# Patient Record
Sex: FEMALE | Race: WHITE | NOT HISPANIC OR LATINO | URBAN - METROPOLITAN AREA
[De-identification: names, ages, dates, MRNs, and addresses within clinical notes are randomized per-mention and may not be internally consistent; named-entity substitution may affect disease eponyms.]

---

## 2020-07-25 ENCOUNTER — TELEPHONE ENCOUNTER (OUTPATIENT)
Dept: URBAN - METROPOLITAN AREA CLINIC 13 | Facility: CLINIC | Age: 65
End: 2020-07-25

## 2020-07-25 RX ORDER — ESTRADIOL 0.75 MG/1.25G
USE AS DIRECTED GEL, METERED TOPICAL
Refills: 0 | OUTPATIENT
Start: 2011-08-19 | End: 2011-09-08

## 2020-07-25 RX ORDER — ESTRADIOL 0.75 MG/1.25G
APPLY DAILY AS DIRECTED GEL, METERED TOPICAL
Refills: 0 | OUTPATIENT
Start: 2011-09-08 | End: 2018-12-12

## 2020-07-25 RX ORDER — POLYETHYLENE GLYCOL 3350 AND ELECTROLYTES WITH LEMON FLAVOR 236; 22.74; 6.74; 5.86; 2.97 G/4L; G/4L; G/4L; G/4L; G/4L
DRINK 8 OUNCES EVERY 20 MINUTES UNTIL HALF OF THE SOLUTION IS CONSUMED. DRINK THE LAST HALF OF THE SOLUTION 6 HOURS PRIOR TO PROCEDURE TIME POWDER, FOR SOLUTION ORAL
Qty: 1 | Refills: 0 | OUTPATIENT
Start: 2013-10-25 | End: 2013-10-29

## 2020-07-26 ENCOUNTER — TELEPHONE ENCOUNTER (OUTPATIENT)
Dept: URBAN - METROPOLITAN AREA CLINIC 13 | Facility: CLINIC | Age: 65
End: 2020-07-26

## 2020-07-26 RX ORDER — TOPIRAMATE 100 MG/1
TAKE 1 CAPSULE BY MOUTH EVERY DAY CAPSULE, EXTENDED RELEASE ORAL
Qty: 30 | Refills: 0 | Status: ACTIVE | COMMUNITY
Start: 2018-11-07

## 2020-07-26 RX ORDER — MELOXICAM 15 MG/1
TAKE 1 TABLET BY MOUTH EVERY DAYY TABLET ORAL
Qty: 90 | Refills: 0 | Status: ACTIVE | COMMUNITY
Start: 2019-02-25

## 2020-07-26 RX ORDER — MELOXICAM 15 MG/1
TABLET ORAL
Qty: 30 | Refills: 0 | Status: ACTIVE | COMMUNITY
Start: 2013-07-09

## 2020-07-26 RX ORDER — HYDROCODONE BITARTRATE AND ACETAMINOPHEN 10; 325 MG/1; MG/1
TABLET ORAL
Qty: 60 | Refills: 0 | Status: ACTIVE | COMMUNITY
Start: 2013-04-29

## 2020-07-26 RX ORDER — CARISOPRODOL 350 MG/1
TABLET ORAL
Qty: 50 | Refills: 0 | Status: ACTIVE | COMMUNITY
Start: 2013-04-09

## 2020-07-26 RX ORDER — HYDROCODONE BITARTRATE AND ACETAMINOPHEN 5; 325 MG/1; MG/1
TABLET ORAL
Qty: 50 | Refills: 0 | Status: ACTIVE | COMMUNITY
Start: 2013-04-09

## 2020-07-26 RX ORDER — IBANDRONATE SODIUM 150 MG
TAKE 1 TABLET ONCE MONTHLY TABLET ORAL
Refills: 0 | Status: ACTIVE | COMMUNITY

## 2020-07-26 RX ORDER — ALENDRONATE SODIUM 70 MG/1
TABLET ORAL
Qty: 4 | Refills: 0 | Status: ACTIVE | COMMUNITY
Start: 2013-06-11

## 2020-07-26 RX ORDER — POLYETHYLENE GLYCOL 3350, SODIUM CHLORIDE, SODIUM BICARBONATE AND POTASSIUM CHLORIDE WITH LEMON FLAVOR 420; 11.2; 5.72; 1.48 G/4L; G/4L; G/4L; G/4L
TAKE 1/2 GALLON AT 5:00 PM DAY BEFORE PROCEDURE, TAKE SECOND 1/2 OF GALLON 6 HRS PRIOR TO PROCEDURE POWDER, FOR SOLUTION ORAL
Qty: 1 | Refills: 0 | Status: ACTIVE | COMMUNITY
Start: 2018-12-12

## 2020-07-26 RX ORDER — PHENTERMINE HYDROCHLORIDE 15 MG/1
TAKE ONE CAPSULE BY MOUTH EVERY MORNING CAPSULE ORAL
Qty: 30 | Refills: 0 | Status: ACTIVE | COMMUNITY
Start: 2018-12-05

## 2020-07-26 RX ORDER — POLYETHYLENE GLYCOL 3350, SODIUM CHLORIDE, SODIUM BICARBONATE AND POTASSIUM CHLORIDE WITH LEMON FLAVOR 420; 11.2; 5.72; 1.48 G/4L; G/4L; G/4L; G/4L
POWDER, FOR SOLUTION ORAL
Qty: 255 | Refills: 0 | Status: ACTIVE | COMMUNITY
Start: 2013-10-26

## 2020-07-26 RX ORDER — AZITHROMYCIN DIHYDRATE 250 MG/1
TABLET, FILM COATED ORAL
Qty: 6 | Refills: 0 | Status: ACTIVE | COMMUNITY
Start: 2013-10-09

## 2020-07-26 RX ORDER — TOPIRAMATE 50 MG/1
TAKE ONE CAPSULE BY MOUTH EVERY DAY CAPSULE, EXTENDED RELEASE ORAL
Qty: 30 | Refills: 0 | Status: ACTIVE | COMMUNITY
Start: 2018-07-13

## 2020-07-26 RX ORDER — PHENTERMINE HYDROCHLORIDE 30 MG/1
TAKE ONE CAPSULE BY MOUTH EVERY MORNING CAPSULE ORAL
Qty: 30 | Refills: 0 | Status: ACTIVE | COMMUNITY
Start: 2019-01-11

## 2022-10-21 ENCOUNTER — OFFICE VISIT (OUTPATIENT)
Dept: URBAN - METROPOLITAN AREA SURGERY CENTER 25 | Facility: SURGERY CENTER | Age: 67
End: 2022-10-21

## 2022-12-01 ENCOUNTER — OFFICE VISIT (OUTPATIENT)
Dept: URBAN - METROPOLITAN AREA SURGERY CENTER 25 | Facility: SURGERY CENTER | Age: 67
End: 2022-12-01

## 2023-01-25 ENCOUNTER — TELEPHONE ENCOUNTER (OUTPATIENT)
Dept: URBAN - METROPOLITAN AREA CLINIC 113 | Facility: CLINIC | Age: 68
End: 2023-01-25

## 2023-01-25 RX ORDER — HYDROCODONE BITARTRATE AND ACETAMINOPHEN 5; 325 MG/1; MG/1
TABLET ORAL
Qty: 50 | Refills: 0 | Status: ACTIVE | COMMUNITY
Start: 2013-04-09

## 2023-01-25 RX ORDER — POLYETHYLENE GLYCOL 3350, SODIUM CHLORIDE, SODIUM BICARBONATE AND POTASSIUM CHLORIDE WITH LEMON FLAVOR 420; 11.2; 5.72; 1.48 G/4L; G/4L; G/4L; G/4L
POWDER, FOR SOLUTION ORAL
Qty: 255 | Refills: 0 | Status: ACTIVE | COMMUNITY
Start: 2013-10-26

## 2023-01-25 RX ORDER — PHENTERMINE HYDROCHLORIDE 30 MG/1
TAKE ONE CAPSULE BY MOUTH EVERY MORNING CAPSULE ORAL
Qty: 30 | Refills: 0 | Status: ACTIVE | COMMUNITY
Start: 2019-01-11

## 2023-01-25 RX ORDER — IBANDRONATE SODIUM 150 MG
TAKE 1 TABLET ONCE MONTHLY TABLET ORAL
Refills: 0 | Status: ACTIVE | COMMUNITY

## 2023-01-25 RX ORDER — CARISOPRODOL 350 MG/1
TABLET ORAL
Qty: 50 | Refills: 0 | Status: ACTIVE | COMMUNITY
Start: 2013-04-09

## 2023-01-25 RX ORDER — SODIUM SULFATE, MAGNESIUM SULFATE, AND POTASSIUM CHLORIDE 17.75; 2.7; 2.25 G/1; G/1; G/1
12 TABLETS TABLET ORAL
Qty: 24 TABLETS | Refills: 0 | OUTPATIENT
Start: 2023-01-25 | End: 2023-01-26

## 2023-01-25 RX ORDER — MELOXICAM 15 MG/1
TABLET ORAL
Qty: 30 | Refills: 0 | Status: ACTIVE | COMMUNITY
Start: 2013-07-09

## 2023-01-25 RX ORDER — TOPIRAMATE 100 MG/1
TAKE 1 CAPSULE BY MOUTH EVERY DAY CAPSULE, EXTENDED RELEASE ORAL
Qty: 30 | Refills: 0 | Status: ACTIVE | COMMUNITY
Start: 2018-11-07

## 2023-01-25 RX ORDER — ALENDRONATE SODIUM 70 MG/1
TABLET ORAL
Qty: 4 | Refills: 0 | Status: ACTIVE | COMMUNITY
Start: 2013-06-11

## 2023-01-25 RX ORDER — TOPIRAMATE 50 MG/1
TAKE ONE CAPSULE BY MOUTH EVERY DAY CAPSULE, EXTENDED RELEASE ORAL
Qty: 30 | Refills: 0 | Status: ACTIVE | COMMUNITY
Start: 2018-07-13

## 2023-01-25 RX ORDER — HYDROCODONE BITARTRATE AND ACETAMINOPHEN 10; 325 MG/1; MG/1
TABLET ORAL
Qty: 60 | Refills: 0 | Status: ACTIVE | COMMUNITY
Start: 2013-04-29

## 2023-01-25 RX ORDER — AZITHROMYCIN DIHYDRATE 250 MG/1
TABLET, FILM COATED ORAL
Qty: 6 | Refills: 0 | Status: ACTIVE | COMMUNITY
Start: 2013-10-09

## 2023-01-25 RX ORDER — PHENTERMINE HYDROCHLORIDE 15 MG/1
TAKE ONE CAPSULE BY MOUTH EVERY MORNING CAPSULE ORAL
Qty: 30 | Refills: 0 | Status: ACTIVE | COMMUNITY
Start: 2018-12-05

## 2023-02-03 ENCOUNTER — OFFICE VISIT (OUTPATIENT)
Dept: URBAN - METROPOLITAN AREA SURGERY CENTER 25 | Facility: SURGERY CENTER | Age: 68
End: 2023-02-03

## 2023-03-24 ENCOUNTER — CLAIMS CREATED FROM THE CLAIM WINDOW (OUTPATIENT)
Dept: URBAN - METROPOLITAN AREA CLINIC 4 | Facility: CLINIC | Age: 68
End: 2023-03-24
Payer: MEDICARE

## 2023-03-24 ENCOUNTER — OFFICE VISIT (OUTPATIENT)
Dept: URBAN - METROPOLITAN AREA SURGERY CENTER 25 | Facility: SURGERY CENTER | Age: 68
End: 2023-03-24
Payer: MEDICARE

## 2023-03-24 DIAGNOSIS — Z12.11 COLON CANCER SCREENING: ICD-10-CM

## 2023-03-24 DIAGNOSIS — D12.5 BENIGN NEOPLASM OF SIGMOID COLON: ICD-10-CM

## 2023-03-24 DIAGNOSIS — D12.5 ADENOMA OF SIGMOID COLON: ICD-10-CM

## 2023-03-24 PROCEDURE — 45385 COLONOSCOPY W/LESION REMOVAL: CPT | Performed by: INTERNAL MEDICINE

## 2023-03-24 PROCEDURE — 88305 TISSUE EXAM BY PATHOLOGIST: CPT | Performed by: PATHOLOGY

## 2023-03-24 PROCEDURE — G8907 PT DOC NO EVENTS ON DISCHARG: HCPCS | Performed by: INTERNAL MEDICINE

## 2023-03-24 RX ORDER — ALENDRONATE SODIUM 70 MG/1
TABLET ORAL
Qty: 4 | Refills: 0 | Status: ACTIVE | COMMUNITY
Start: 2013-06-11

## 2023-03-24 RX ORDER — TOPIRAMATE 50 MG/1
TAKE ONE CAPSULE BY MOUTH EVERY DAY CAPSULE, EXTENDED RELEASE ORAL
Qty: 30 | Refills: 0 | Status: ACTIVE | COMMUNITY
Start: 2018-07-13

## 2023-03-24 RX ORDER — IBANDRONATE SODIUM 150 MG
TAKE 1 TABLET ONCE MONTHLY TABLET ORAL
Refills: 0 | Status: ACTIVE | COMMUNITY

## 2023-03-24 RX ORDER — HYDROCODONE BITARTRATE AND ACETAMINOPHEN 5; 325 MG/1; MG/1
TABLET ORAL
Qty: 50 | Refills: 0 | Status: ACTIVE | COMMUNITY
Start: 2013-04-09

## 2023-03-24 RX ORDER — CARISOPRODOL 350 MG/1
TABLET ORAL
Qty: 50 | Refills: 0 | Status: ACTIVE | COMMUNITY
Start: 2013-04-09

## 2023-03-24 RX ORDER — AZITHROMYCIN DIHYDRATE 250 MG/1
TABLET, FILM COATED ORAL
Qty: 6 | Refills: 0 | Status: ACTIVE | COMMUNITY
Start: 2013-10-09

## 2023-03-24 RX ORDER — TOPIRAMATE 100 MG/1
TAKE 1 CAPSULE BY MOUTH EVERY DAY CAPSULE, EXTENDED RELEASE ORAL
Qty: 30 | Refills: 0 | Status: ACTIVE | COMMUNITY
Start: 2018-11-07

## 2023-03-24 RX ORDER — POLYETHYLENE GLYCOL 3350, SODIUM CHLORIDE, SODIUM BICARBONATE AND POTASSIUM CHLORIDE WITH LEMON FLAVOR 420; 11.2; 5.72; 1.48 G/4L; G/4L; G/4L; G/4L
POWDER, FOR SOLUTION ORAL
Qty: 255 | Refills: 0 | Status: ACTIVE | COMMUNITY
Start: 2013-10-26

## 2023-03-24 RX ORDER — MELOXICAM 15 MG/1
TABLET ORAL
Qty: 30 | Refills: 0 | Status: ACTIVE | COMMUNITY
Start: 2013-07-09

## 2023-03-24 RX ORDER — PHENTERMINE HYDROCHLORIDE 30 MG/1
TAKE ONE CAPSULE BY MOUTH EVERY MORNING CAPSULE ORAL
Qty: 30 | Refills: 0 | Status: ACTIVE | COMMUNITY
Start: 2019-01-11

## 2023-03-24 RX ORDER — PHENTERMINE HYDROCHLORIDE 15 MG/1
TAKE ONE CAPSULE BY MOUTH EVERY MORNING CAPSULE ORAL
Qty: 30 | Refills: 0 | Status: ACTIVE | COMMUNITY
Start: 2018-12-05

## 2023-03-24 RX ORDER — HYDROCODONE BITARTRATE AND ACETAMINOPHEN 10; 325 MG/1; MG/1
TABLET ORAL
Qty: 60 | Refills: 0 | Status: ACTIVE | COMMUNITY
Start: 2013-04-29

## 2023-07-20 ENCOUNTER — OFFICE VISIT (OUTPATIENT)
Dept: URBAN - METROPOLITAN AREA CLINIC 113 | Facility: CLINIC | Age: 68
End: 2023-07-20
Payer: MEDICARE

## 2023-07-20 VITALS
DIASTOLIC BLOOD PRESSURE: 75 MMHG | HEART RATE: 79 BPM | HEIGHT: 63 IN | RESPIRATION RATE: 14 BRPM | SYSTOLIC BLOOD PRESSURE: 119 MMHG | BODY MASS INDEX: 24.98 KG/M2 | TEMPERATURE: 97.7 F | WEIGHT: 141 LBS

## 2023-07-20 DIAGNOSIS — K59.04 CHRONIC IDIOPATHIC CONSTIPATION: ICD-10-CM

## 2023-07-20 DIAGNOSIS — K57.32 DIVERTICULITIS, COLON: ICD-10-CM

## 2023-07-20 PROBLEM — 111359004: Status: ACTIVE | Noted: 2023-07-20

## 2023-07-20 PROCEDURE — 99213 OFFICE O/P EST LOW 20 MIN: CPT | Performed by: INTERNAL MEDICINE

## 2023-07-20 RX ORDER — TOPIRAMATE 50 MG/1
TAKE ONE CAPSULE BY MOUTH EVERY DAY CAPSULE, EXTENDED RELEASE ORAL
Qty: 30 | Refills: 0 | Status: ON HOLD | COMMUNITY
Start: 2018-07-13

## 2023-07-20 RX ORDER — IBANDRONATE SODIUM 150 MG
TAKE 1 TABLET ONCE MONTHLY TABLET ORAL
Refills: 0 | Status: ON HOLD | COMMUNITY

## 2023-07-20 RX ORDER — TOPIRAMATE 100 MG/1
TAKE 1 CAPSULE BY MOUTH EVERY DAY CAPSULE, EXTENDED RELEASE ORAL
Qty: 30 | Refills: 0 | Status: ON HOLD | COMMUNITY
Start: 2018-11-07

## 2023-07-20 RX ORDER — AZITHROMYCIN DIHYDRATE 250 MG/1
TABLET, FILM COATED ORAL
Qty: 6 | Refills: 0 | Status: ON HOLD | COMMUNITY
Start: 2013-10-09

## 2023-07-20 RX ORDER — HYDROCODONE BITARTRATE AND ACETAMINOPHEN 10; 325 MG/1; MG/1
TABLET ORAL
Qty: 60 | Refills: 0 | Status: ON HOLD | COMMUNITY
Start: 2013-04-29

## 2023-07-20 RX ORDER — PHENTERMINE HYDROCHLORIDE 30 MG/1
TAKE ONE CAPSULE BY MOUTH EVERY MORNING CAPSULE ORAL
Qty: 30 | Refills: 0 | Status: ON HOLD | COMMUNITY
Start: 2019-01-11

## 2023-07-20 RX ORDER — VALSARTAN AND HYDROCHLOROTHIAZIDE 160; 12.5 MG/1; MG/1
1 TABLET TABLET, FILM COATED ORAL ONCE A DAY
Status: ACTIVE | COMMUNITY

## 2023-07-20 RX ORDER — PHENTERMINE HYDROCHLORIDE 15 MG/1
TAKE ONE CAPSULE BY MOUTH EVERY MORNING CAPSULE ORAL
Qty: 30 | Refills: 0 | Status: ON HOLD | COMMUNITY
Start: 2018-12-05

## 2023-07-20 RX ORDER — ALENDRONATE SODIUM 70 MG/1
TABLET ORAL
Qty: 4 | Refills: 0 | Status: ON HOLD | COMMUNITY
Start: 2013-06-11

## 2023-07-20 RX ORDER — CARISOPRODOL 350 MG/1
TABLET ORAL
Qty: 50 | Refills: 0 | Status: ON HOLD | COMMUNITY
Start: 2013-04-09

## 2023-07-20 RX ORDER — HYDROCODONE BITARTRATE AND ACETAMINOPHEN 5; 325 MG/1; MG/1
TABLET ORAL
Qty: 50 | Refills: 0 | Status: ON HOLD | COMMUNITY
Start: 2013-04-09

## 2023-07-20 RX ORDER — AMOXICILLIN AND CLAVULANATE POTASSIUM 875; 125 MG/1; MG/1
1 TABLET TABLET, FILM COATED ORAL
Qty: 20 | Refills: 1 | OUTPATIENT
Start: 2023-07-20 | End: 2023-08-09

## 2023-07-20 RX ORDER — MELOXICAM 15 MG/1
TABLET ORAL
Qty: 30 | Refills: 0 | Status: ACTIVE | COMMUNITY
Start: 2013-07-09

## 2023-07-20 RX ORDER — ROSUVASTATIN CALCIUM 20 MG/1
1 TABLET TABLET, COATED ORAL ONCE A DAY
Status: ACTIVE | COMMUNITY

## 2023-07-20 RX ORDER — POLYETHYLENE GLYCOL 3350, SODIUM CHLORIDE, SODIUM BICARBONATE AND POTASSIUM CHLORIDE WITH LEMON FLAVOR 420; 11.2; 5.72; 1.48 G/4L; G/4L; G/4L; G/4L
POWDER, FOR SOLUTION ORAL
Qty: 255 | Refills: 0 | Status: ON HOLD | COMMUNITY
Start: 2013-10-26

## 2023-07-20 RX ORDER — OMEPRAZOLE 20 MG/1
1 CAPSULE 30 MINUTES BEFORE MORNING MEAL CAPSULE, DELAYED RELEASE ORAL ONCE A DAY
Status: ACTIVE | COMMUNITY

## 2023-07-20 NOTE — HPI-TODAY'S VISIT:
Ms. Verdin is a 67-year-old woman with a past medical history of hypertension presenting for follow-up after a bout of acute diverticulitis.  She was seen for routine colonoscopy on 3/24/2023 with findings notable for sigmoid colon, descending colon and ascending colon diverticulosis, and removal of a 4 mm sigmoid colon tubular adenoma.  She was recommended to undergo repeat colonoscopy for colon polyp surveillance in 7 years.  In July she developed sudden onset of nausea, vomiting and abdominal pain on 7/4/2023 that crescendoed over the next week. CT abdomen pelvis with contrast on 7/12/2023 shows soft tissue attenuation and mesenteric stranding/inflammation within the right lower quadrant with a 2.3 cm peripheral enhancing fluid collection concerning for abscess concerning for perforated appendicitis with abscess, 1.2 cm indeterminate hypodensity involving the left kidney, moderate hiatal hernia, significant degenerative changes at L5-S1.  Follow-up ultrasound kidneys on 7/24/2023 shows a 1.5 cm cystic lesion of the left kidney.  She was admitted to the hospital and treated with Flagyl.  It was felt that the findings most likely represented diverticulitis.  She was discharged to home the following day.  She was treated for antibiotics for 10 days (Flagyl).  She is currently doing well with no abdominal pain.  Reports longstanding issues with constipation.  She denies any red blood per rectum.

## 2023-08-03 PROBLEM — 82934008: Status: ACTIVE | Noted: 2023-08-03

## 2023-08-05 PROBLEM — 429047008: Status: ACTIVE | Noted: 2023-08-05

## 2023-08-08 ENCOUNTER — WEB ENCOUNTER (OUTPATIENT)
Dept: URBAN - METROPOLITAN AREA CLINIC 113 | Facility: CLINIC | Age: 68
End: 2023-08-08

## 2023-08-08 RX ORDER — PLECANATIDE 3 MG/1
1 TABLET TABLET ORAL ONCE A DAY
Qty: 30 | Refills: 5 | OUTPATIENT
Start: 2023-08-08 | End: 2024-02-03

## 2023-08-11 ENCOUNTER — TELEPHONE ENCOUNTER (OUTPATIENT)
Dept: URBAN - METROPOLITAN AREA CLINIC 113 | Facility: CLINIC | Age: 68
End: 2023-08-11

## 2023-08-11 RX ORDER — LINACLOTIDE 72 UG/1
1 CAPSULE AT LEAST 30 MINUTES BEFORE THE FIRST MEAL OF THE DAY ON AN EMPTY STOMACH CAPSULE, GELATIN COATED ORAL ONCE A DAY
Qty: 90 | Refills: 3 | OUTPATIENT
Start: 2023-08-14 | End: 2024-08-08

## 2023-12-19 ENCOUNTER — TELEPHONE ENCOUNTER (OUTPATIENT)
Dept: URBAN - METROPOLITAN AREA CLINIC 113 | Facility: CLINIC | Age: 68
End: 2023-12-19

## 2024-03-04 ENCOUNTER — OV EP (OUTPATIENT)
Dept: URBAN - METROPOLITAN AREA CLINIC 113 | Facility: CLINIC | Age: 69
End: 2024-03-04
Payer: MEDICARE

## 2024-03-04 VITALS
HEIGHT: 63 IN | TEMPERATURE: 97.1 F | SYSTOLIC BLOOD PRESSURE: 119 MMHG | DIASTOLIC BLOOD PRESSURE: 91 MMHG | BODY MASS INDEX: 29.55 KG/M2 | HEART RATE: 66 BPM | RESPIRATION RATE: 14 BRPM | WEIGHT: 166.8 LBS

## 2024-03-04 DIAGNOSIS — K21.9 GASTROESOPHAGEAL REFLUX DISEASE, UNSPECIFIED WHETHER ESOPHAGITIS PRESENT: ICD-10-CM

## 2024-03-04 DIAGNOSIS — K59.04 CHRONIC IDIOPATHIC CONSTIPATION: ICD-10-CM

## 2024-03-04 PROCEDURE — 99213 OFFICE O/P EST LOW 20 MIN: CPT | Performed by: INTERNAL MEDICINE

## 2024-03-04 RX ORDER — HYDROCODONE BITARTRATE AND ACETAMINOPHEN 10; 325 MG/1; MG/1
TABLET ORAL
Qty: 60 | Refills: 0 | Status: ON HOLD | COMMUNITY
Start: 2013-04-29

## 2024-03-04 RX ORDER — ALENDRONATE SODIUM 70 MG/1
TABLET ORAL
Qty: 4 | Refills: 0 | Status: ON HOLD | COMMUNITY
Start: 2013-06-11

## 2024-03-04 RX ORDER — CARISOPRODOL 350 MG/1
TABLET ORAL
Qty: 50 | Refills: 0 | Status: ON HOLD | COMMUNITY
Start: 2013-04-09

## 2024-03-04 RX ORDER — IBANDRONATE SODIUM 150 MG
TAKE 1 TABLET ONCE MONTHLY TABLET ORAL
Refills: 0 | Status: ON HOLD | COMMUNITY

## 2024-03-04 RX ORDER — ROSUVASTATIN CALCIUM 20 MG/1
1 TABLET TABLET, COATED ORAL ONCE A DAY
Status: ACTIVE | COMMUNITY

## 2024-03-04 RX ORDER — PHENTERMINE HYDROCHLORIDE 30 MG/1
TAKE ONE CAPSULE BY MOUTH EVERY MORNING CAPSULE ORAL
Qty: 30 | Refills: 0 | Status: ON HOLD | COMMUNITY
Start: 2019-01-11

## 2024-03-04 RX ORDER — TOPIRAMATE 50 MG/1
TAKE ONE CAPSULE BY MOUTH EVERY DAY CAPSULE, EXTENDED RELEASE ORAL
Qty: 30 | Refills: 0 | Status: ON HOLD | COMMUNITY
Start: 2018-07-13

## 2024-03-04 RX ORDER — LINACLOTIDE 72 UG/1
1 CAPSULE AT LEAST 30 MINUTES BEFORE THE FIRST MEAL OF THE DAY ON AN EMPTY STOMACH CAPSULE, GELATIN COATED ORAL ONCE A DAY
Qty: 90 | Refills: 3 | Status: ON HOLD | COMMUNITY
Start: 2023-08-14 | End: 2024-08-08

## 2024-03-04 RX ORDER — HYDROCODONE BITARTRATE AND ACETAMINOPHEN 5; 325 MG/1; MG/1
TABLET ORAL
Qty: 50 | Refills: 0 | Status: ON HOLD | COMMUNITY
Start: 2013-04-09

## 2024-03-04 RX ORDER — MELOXICAM 15 MG/1
TABLET ORAL
Qty: 30 | Refills: 0 | Status: ON HOLD | COMMUNITY
Start: 2013-07-09

## 2024-03-04 RX ORDER — POLYETHYLENE GLYCOL 3350, SODIUM CHLORIDE, SODIUM BICARBONATE AND POTASSIUM CHLORIDE WITH LEMON FLAVOR 420; 11.2; 5.72; 1.48 G/4L; G/4L; G/4L; G/4L
POWDER, FOR SOLUTION ORAL
Qty: 255 | Refills: 0 | Status: ON HOLD | COMMUNITY
Start: 2013-10-26

## 2024-03-04 RX ORDER — OMEPRAZOLE 20 MG/1
1 CAPSULE 30 MINUTES BEFORE MORNING MEAL CAPSULE, DELAYED RELEASE ORAL ONCE A DAY
Status: ACTIVE | COMMUNITY

## 2024-03-04 RX ORDER — OMEPRAZOLE 20 MG/1
1 CAPSULE 30 MINUTES BEFORE MORNING MEAL CAPSULE, DELAYED RELEASE ORAL ONCE A DAY
OUTPATIENT

## 2024-03-04 RX ORDER — PHENTERMINE HYDROCHLORIDE 15 MG/1
TAKE ONE CAPSULE BY MOUTH EVERY MORNING CAPSULE ORAL
Qty: 30 | Refills: 0 | Status: ON HOLD | COMMUNITY
Start: 2018-12-05

## 2024-03-04 RX ORDER — VALSARTAN AND HYDROCHLOROTHIAZIDE 160; 12.5 MG/1; MG/1
1 TABLET TABLET, FILM COATED ORAL ONCE A DAY
Status: ACTIVE | COMMUNITY

## 2024-03-04 RX ORDER — AZITHROMYCIN DIHYDRATE 250 MG/1
TABLET, FILM COATED ORAL
Qty: 6 | Refills: 0 | Status: ON HOLD | COMMUNITY
Start: 2013-10-09

## 2024-03-04 RX ORDER — TOPIRAMATE 100 MG/1
TAKE 1 CAPSULE BY MOUTH EVERY DAY CAPSULE, EXTENDED RELEASE ORAL
Qty: 30 | Refills: 0 | Status: ON HOLD | COMMUNITY
Start: 2018-11-07

## 2024-03-04 NOTE — HPI-TODAY'S VISIT:
Ms. Verdin is a 68-year-old woman presenting for follow up regarding GERD and constipation.  She was last seen on 7/20/2023 for follow-up regarding a bout of acute colon diverticulitis and chronic idiopathic constipation treated with Trulance 3 mg daily.  She telephoned after the last visit on 8/11/2023 reporting that the Trulance was not covered by her insurance.  She was trialed on Linzess 72 mcg daily.  She reports that the Linzess 72 mcg daily was "too much" nausea resulted in diarrhea.  She reports that the Trulance was much more effective, but not covered by her insurance.  She is currently taking 2 magnesium tablets at night with fairly good relief of constipation.  She is having a bowel movement daily to every other day that is fairly normal consistency.  She denies any abdominal pain, red blood per rectum or weight loss.    She occasionally has heartburn that she attributes to overeating. She intermittent takes omeprazole with relief of symptoms.  No dysphagia.  No nausea, vomiting, melena.

## 2024-03-04 NOTE — HPI-OTHER HISTORIES
Colonoscopy (3/24/2023): findings notable for sigmoid colon, descending colon and ascending colon diverticulosis, and removal of a 4 mm sigmoid colon tubular adenoma. She was recommended to undergo repeat colonoscopy for colon polyp surveillance in 7 years.

## 2024-03-21 PROBLEM — 235595009: Status: ACTIVE | Noted: 2024-03-21
